# Patient Record
Sex: FEMALE | Race: WHITE | NOT HISPANIC OR LATINO
[De-identification: names, ages, dates, MRNs, and addresses within clinical notes are randomized per-mention and may not be internally consistent; named-entity substitution may affect disease eponyms.]

---

## 2020-12-30 ENCOUNTER — TRANSCRIBE ORDERS (OUTPATIENT)
Dept: SCHEDULING | Age: 35
End: 2020-12-30

## 2020-12-30 DIAGNOSIS — Z01.818 ENCOUNTER FOR OTHER PREPROCEDURAL EXAMINATION: Primary | ICD-10-CM

## 2021-01-04 ENCOUNTER — APPOINTMENT (OUTPATIENT)
Dept: LAB | Facility: HOSPITAL | Age: 36
End: 2021-01-04
Attending: OBSTETRICS & GYNECOLOGY
Payer: COMMERCIAL

## 2021-01-04 ENCOUNTER — TRANSCRIBE ORDERS (OUTPATIENT)
Dept: PREADMISSION TESTING | Facility: HOSPITAL | Age: 36
End: 2021-01-04

## 2021-01-04 DIAGNOSIS — Z11.59 ENCOUNTER FOR SCREENING FOR OTHER VIRAL DISEASES: ICD-10-CM

## 2021-01-04 DIAGNOSIS — Z01.818 ENCOUNTER FOR OTHER PREPROCEDURAL EXAMINATION: ICD-10-CM

## 2021-01-04 DIAGNOSIS — Z01.818 ENCOUNTER FOR OTHER PREPROCEDURAL EXAMINATION: Primary | ICD-10-CM

## 2021-01-04 LAB
ABO + RH BLD: NORMAL
BLD GP AB SCN SERPL QL: NEGATIVE
BLOOD BANK CMNT PATIENT-IMP: NORMAL
D AG BLD QL: POSITIVE
ERYTHROCYTE [DISTWIDTH] IN BLOOD BY AUTOMATED COUNT: 11.6 % (ref 11.7–14.4)
HCG UR QL: NEGATIVE
HCT VFR BLDCO AUTO: 41.4 % (ref 35–45)
HGB BLD-MCNC: 14.3 G/DL (ref 11.8–15.7)
LABORATORY COMMENT REPORT: NORMAL
MCH RBC QN AUTO: 30.6 PG (ref 28–33.2)
MCHC RBC AUTO-ENTMCNC: 34.5 G/DL (ref 32.2–35.5)
MCV RBC AUTO: 88.5 FL (ref 83–98)
PDW BLD AUTO: 9 FL (ref 9.4–12.3)
PLATELET # BLD AUTO: 428 K/UL (ref 150–369)
RBC # BLD AUTO: 4.68 M/UL (ref 3.93–5.22)
SPECIMEN EXP DATE BLD: NORMAL
WBC # BLD AUTO: 7.69 K/UL (ref 3.8–10.5)

## 2021-01-04 PROCEDURE — C9803 HOPD COVID-19 SPEC COLLECT: HCPCS

## 2021-01-04 PROCEDURE — 84703 CHORIONIC GONADOTROPIN ASSAY: CPT

## 2021-01-04 PROCEDURE — U0003 INFECTIOUS AGENT DETECTION BY NUCLEIC ACID (DNA OR RNA); SEVERE ACUTE RESPIRATORY SYNDROME CORONAVIRUS 2 (SARS-COV-2) (CORONAVIRUS DISEASE [COVID-19]), AMPLIFIED PROBE TECHNIQUE, MAKING USE OF HIGH THROUGHPUT TECHNOLOGIES AS DESCRIBED BY CMS-2020-01-R: HCPCS

## 2021-01-04 PROCEDURE — 86850 RBC ANTIBODY SCREEN: CPT

## 2021-01-04 PROCEDURE — 36415 COLL VENOUS BLD VENIPUNCTURE: CPT

## 2021-01-04 PROCEDURE — 85027 COMPLETE CBC AUTOMATED: CPT

## 2021-01-05 LAB — SARS-COV-2 RNA RESP QL NAA+PROBE: NOT DETECTED

## 2021-01-06 ENCOUNTER — ANESTHESIA EVENT (OUTPATIENT)
Dept: OPERATING ROOM | Facility: HOSPITAL | Age: 36
Setting detail: HOSPITAL OUTPATIENT SURGERY
End: 2021-01-06
Payer: COMMERCIAL

## 2021-01-06 PROBLEM — N80.9 ENDOMETRIOSIS DETERMINED BY LAPAROSCOPY: Status: ACTIVE | Noted: 2021-01-06

## 2021-01-06 ASSESSMENT — ENCOUNTER SYMPTOMS: HEADACHES: 1

## 2021-01-06 NOTE — H&P
Gynecology History and Physical    HPI     Patient is a 35 y.o. female who presents with LEFT LOWER QUADRANT PAIN, SEVERE DYSMENORRHEA, 2 YRS OF INFERTILITY AND A FIXED LEFT OVARY WITH CDS NODULARITY.     OB History:   OB History   No obstetric history on file.       Medical History:   Past Medical History:   Diagnosis Date   • ADD (attention deficit disorder)    • Anxiety     history of/ no meds currently   • Endometriosis    • GERD (gastroesophageal reflux disease)     no recent sx   • History of gastric ulcer 2017   • Migraines    • Muscle spasm of back     lower back-occasional   • Ovarian cyst     left ovary   • PONV (postoperative nausea and vomiting)        Surgical History:   Past Surgical History:   Procedure Laterality Date   • UPPER GASTROINTESTINAL ENDOSCOPY     • WISDOM TOOTH EXTRACTION         Social History:   Social History     Social History Narrative   • Not on file       Family History: No family history on file.    Allergies: Patient has no known allergies.    Prior to Admission medications    Medication Sig Start Date End Date Taking? Authorizing Provider   HYDROcodone-acetaminophen (NORCO) 7.5-325 mg per tablet Take 1 tablet by mouth every 6 (six) hours as needed for moderate pain.    Provider, MD Lisa   SUMAtriptan (IMITREX) 50 mg tablet Take 50 mg by mouth once as needed for migraine. May repeat dose once in 2hr if no relief. Do not exceed 2 doses in 24hr.    Provider, MD Lisa       Review of Systems  All other systems reviewed and negative except as noted in the HPI.    Objective     Vital Signs for the last 24 hours:       Exam  General Appearance: Alert, cooperative, no acute distress  Head: Normocephalic, without obvious abnormality, atraumatic  Throat: Lips, mucosa, and tongue appear normal  Neck: no adenopathy  Thyroid: no enlargement/tenderness/nodules  Lungs: Clear to auscultation bilaterally, respirations unlabored  Heart: Regular rate and rhythm, S1 and S2 normal, no  murmur, rub or gallop  Breast: Deferred  Abdomen: Soft, nontender, nondistended, bowel sounds active all four quadrants,  no masses, no organomegaly  Back: no CVA tenderness  Genitalia: no lesions or masses, no uterine, cervical, or adnexal tenderness, no discharge or vaginal bleeding present  Rectal: Deferred  Extremities: no edema or calf tenderness  Musculoskeletal: No injury or deformity  Skin: Skin color, texture, turgor normal, no rashes or lesions  Lymph nodes: inguinal and axillary nodes normal  Neurologic: Deferred    Labs  WNL          Assessment/Plan   1. SMALL LEFT OVARIAN CYST    Code Status: No Order    FOR LPY, CHROMOPERTUBATION, FULGURATION OF EMS, TANYA AND POSSIBLE LSO

## 2021-01-06 NOTE — ANESTHESIA PREPROCEDURE EVALUATION
Relevant Problems   No relevant active problems       Anesthesia ROS/MED HX    Anesthesia History    Previous anesthetics   History of anesthetic complications  - PONV  Neuro/Psych    Headaches   Anxiety  Cardiovascular   Covid19 Test Reviewed  GI/Hepatic   GERD  ROS/MED HX Comments:    GI/Hepatic/Renal: Chronic abdominal/back pain, takes Narco 7.5/325 Q 4-6h   Endo: Endometriosis  Left ovarian cyst       Past Surgical History:   Procedure Laterality Date   • UPPER GASTROINTESTINAL ENDOSCOPY     • WISDOM TOOTH EXTRACTION       LABS:    CBC Results       01/04/21                          1543           WBC 7.69           RBC 4.68           HGB 14.3           HCT 41.4           MCV 88.5           MCH 30.6           MCHC 34.5                                    BMP Results     No lab values to display.        PT/PTT Results     No lab values to display.            Results from last 7 days   Lab Units 01/04/21  1540   QUEST SARS COV 2 RNA  NOT DETECTED     Lab Results   Component Value Date    LABANTI Negative 01/04/2021    ABO A 01/04/2021    LABRH Positive 01/04/2021    HISTCK No type on file 01/04/2021         Physical Exam    Airway   Mallampati: III   TM distance: >3 FB   Neck ROM: full  Cardiovascular - normal   Rhythm: regular   Rate: normalPulmonary - normal   clear to auscultation  Dental - normal        Anesthesia Plan    Plan: general    Technique: general endotracheal     Lines and Monitors: PIV     Airway: direct visual laryngoscopy and oral intubation   ASA 2  Anesthetic plan and risks discussed with: patient  Induction:    intravenous   Postop Plan:   Patient Disposition: phase II then home   Pain Management: IV analgesics  Comments:    Plan: Background prop infusion, midaz, Decadron 8, Zofran  No toradol 2/2 h/o gastric ulcer  Takes Narco 7.5/325 Q 4-6h, pt will have higher opioid requirements

## 2021-01-07 ENCOUNTER — ANESTHESIA (OUTPATIENT)
Dept: OPERATING ROOM | Facility: HOSPITAL | Age: 36
Setting detail: HOSPITAL OUTPATIENT SURGERY
End: 2021-01-07
Payer: COMMERCIAL

## 2021-01-07 ENCOUNTER — HOSPITAL ENCOUNTER (OUTPATIENT)
Facility: HOSPITAL | Age: 36
Setting detail: HOSPITAL OUTPATIENT SURGERY
Discharge: HOME | End: 2021-01-07
Attending: OBSTETRICS & GYNECOLOGY | Admitting: OBSTETRICS & GYNECOLOGY
Payer: COMMERCIAL

## 2021-01-07 VITALS
SYSTOLIC BLOOD PRESSURE: 121 MMHG | BODY MASS INDEX: 29.16 KG/M2 | RESPIRATION RATE: 16 BRPM | TEMPERATURE: 98.4 F | WEIGHT: 175 LBS | HEART RATE: 84 BPM | DIASTOLIC BLOOD PRESSURE: 65 MMHG | OXYGEN SATURATION: 99 % | HEIGHT: 65 IN

## 2021-01-07 DIAGNOSIS — R10.2 PELVIC PAIN IN FEMALE: ICD-10-CM

## 2021-01-07 LAB
ABO + RH BLD: NORMAL
D AG BLD QL: POSITIVE
LABORATORY COMMENT REPORT: NORMAL

## 2021-01-07 PROCEDURE — 36000004 HC OR LEVEL 4 INITIAL 30MIN: Performed by: OBSTETRICS & GYNECOLOGY

## 2021-01-07 PROCEDURE — 36000014 HC OR LEVEL 4 EA ADDL MIN: Performed by: OBSTETRICS & GYNECOLOGY

## 2021-01-07 PROCEDURE — 0UB14ZZ EXCISION OF LEFT OVARY, PERCUTANEOUS ENDOSCOPIC APPROACH: ICD-10-PCS | Performed by: OBSTETRICS & GYNECOLOGY

## 2021-01-07 PROCEDURE — 25000000 HC PHARMACY GENERAL: Performed by: OBSTETRICS & GYNECOLOGY

## 2021-01-07 PROCEDURE — 71000001 HC PACU PHASE 1 INITIAL 30MIN: Performed by: OBSTETRICS & GYNECOLOGY

## 2021-01-07 PROCEDURE — 71000002 HC PACU PHASE 2 INITIAL 30MIN: Performed by: OBSTETRICS & GYNECOLOGY

## 2021-01-07 PROCEDURE — 71000012 HC PACU PHASE 2 EA ADDL MIN: Performed by: OBSTETRICS & GYNECOLOGY

## 2021-01-07 PROCEDURE — 63600000 HC DRUGS/DETAIL CODE: Performed by: NURSE ANESTHETIST, CERTIFIED REGISTERED

## 2021-01-07 PROCEDURE — 63600000 HC DRUGS/DETAIL CODE: Performed by: STUDENT IN AN ORGANIZED HEALTH CARE EDUCATION/TRAINING PROGRAM

## 2021-01-07 PROCEDURE — 25000000 HC PHARMACY GENERAL: Performed by: NURSE ANESTHETIST, CERTIFIED REGISTERED

## 2021-01-07 PROCEDURE — 37000001 HC ANESTHESIA GENERAL: Performed by: OBSTETRICS & GYNECOLOGY

## 2021-01-07 PROCEDURE — 36415 COLL VENOUS BLD VENIPUNCTURE: CPT | Performed by: OBSTETRICS & GYNECOLOGY

## 2021-01-07 PROCEDURE — 63600000 HC DRUGS/DETAIL CODE: Performed by: OBSTETRICS & GYNECOLOGY

## 2021-01-07 PROCEDURE — 63700000 HC SELF-ADMINISTRABLE DRUG: Performed by: OBSTETRICS & GYNECOLOGY

## 2021-01-07 PROCEDURE — 25800000 HC PHARMACY IV SOLUTIONS: Performed by: NURSE ANESTHETIST, CERTIFIED REGISTERED

## 2021-01-07 PROCEDURE — 27200000 HC STERILE SUPPLY: Performed by: OBSTETRICS & GYNECOLOGY

## 2021-01-07 PROCEDURE — 71000011 HC PACU PHASE 1 EA ADDL MIN: Performed by: OBSTETRICS & GYNECOLOGY

## 2021-01-07 PROCEDURE — 63700000 HC SELF-ADMINISTRABLE DRUG

## 2021-01-07 PROCEDURE — 88305 TISSUE EXAM BY PATHOLOGIST: CPT | Performed by: OBSTETRICS & GYNECOLOGY

## 2021-01-07 PROCEDURE — 27800000 HC SUPPLY/IMPLANTS: Performed by: OBSTETRICS & GYNECOLOGY

## 2021-01-07 RX ORDER — HYDROMORPHONE HYDROCHLORIDE 1 MG/ML
0.5 INJECTION, SOLUTION INTRAMUSCULAR; INTRAVENOUS; SUBCUTANEOUS
Status: DISCONTINUED | OUTPATIENT
Start: 2021-01-07 | End: 2021-01-07 | Stop reason: HOSPADM

## 2021-01-07 RX ORDER — HYDROCODONE BITARTRATE AND ACETAMINOPHEN 5; 325 MG/1; MG/1
1-2 TABLET ORAL EVERY 4 HOURS PRN
Qty: 30 TABLET | Refills: 0 | Status: SHIPPED | OUTPATIENT
Start: 2021-01-07 | End: 2021-01-12

## 2021-01-07 RX ORDER — METOCLOPRAMIDE HYDROCHLORIDE 5 MG/ML
10 INJECTION INTRAMUSCULAR; INTRAVENOUS
Status: DISCONTINUED | OUTPATIENT
Start: 2021-01-07 | End: 2021-01-07 | Stop reason: HOSPADM

## 2021-01-07 RX ORDER — PROPOFOL 10 MG/ML
INJECTION, EMULSION INTRAVENOUS AS NEEDED
Status: DISCONTINUED | OUTPATIENT
Start: 2021-01-07 | End: 2021-01-07 | Stop reason: SURG

## 2021-01-07 RX ORDER — SCOPOLAMINE 1 MG/3D
1 PATCH, EXTENDED RELEASE TRANSDERMAL ONCE
Status: DISCONTINUED | OUTPATIENT
Start: 2021-01-07 | End: 2021-01-07 | Stop reason: HOSPADM

## 2021-01-07 RX ORDER — DEXTROSE 40 %
15-30 GEL (GRAM) ORAL AS NEEDED
Status: DISCONTINUED | OUTPATIENT
Start: 2021-01-07 | End: 2021-01-07 | Stop reason: HOSPADM

## 2021-01-07 RX ORDER — ONDANSETRON HYDROCHLORIDE 2 MG/ML
4 INJECTION, SOLUTION INTRAVENOUS
Status: DISCONTINUED | OUTPATIENT
Start: 2021-01-07 | End: 2021-01-07 | Stop reason: HOSPADM

## 2021-01-07 RX ORDER — DEXTROSE 50 % IN WATER (D50W) INTRAVENOUS SYRINGE
25 AS NEEDED
Status: DISCONTINUED | OUTPATIENT
Start: 2021-01-07 | End: 2021-01-07 | Stop reason: HOSPADM

## 2021-01-07 RX ORDER — OXYCODONE HYDROCHLORIDE 5 MG/1
TABLET ORAL
Status: DISCONTINUED
Start: 2021-01-07 | End: 2021-01-07 | Stop reason: HOSPADM

## 2021-01-07 RX ORDER — KETOROLAC TROMETHAMINE 15 MG/ML
15 INJECTION, SOLUTION INTRAMUSCULAR; INTRAVENOUS EVERY 6 HOURS PRN
Status: CANCELLED | OUTPATIENT
Start: 2021-01-07 | End: 2021-01-12

## 2021-01-07 RX ORDER — FENTANYL CITRATE 50 UG/ML
50 INJECTION, SOLUTION INTRAMUSCULAR; INTRAVENOUS
Status: DISCONTINUED | OUTPATIENT
Start: 2021-01-07 | End: 2021-01-07 | Stop reason: HOSPADM

## 2021-01-07 RX ORDER — CEFAZOLIN SODIUM 2 G/50ML
SOLUTION INTRAVENOUS
Status: COMPLETED
Start: 2021-01-07 | End: 2021-01-07

## 2021-01-07 RX ORDER — CEFAZOLIN SODIUM 2 G/50ML
2 SOLUTION INTRAVENOUS
Status: COMPLETED | OUTPATIENT
Start: 2021-01-07 | End: 2021-01-07

## 2021-01-07 RX ORDER — GLYCOPYRROLATE 0.6MG/3ML
SYRINGE (ML) INTRAVENOUS AS NEEDED
Status: DISCONTINUED | OUTPATIENT
Start: 2021-01-07 | End: 2021-01-07 | Stop reason: SURG

## 2021-01-07 RX ORDER — ROCURONIUM BROMIDE 10 MG/ML
INJECTION, SOLUTION INTRAVENOUS AS NEEDED
Status: DISCONTINUED | OUTPATIENT
Start: 2021-01-07 | End: 2021-01-07 | Stop reason: SURG

## 2021-01-07 RX ORDER — FENTANYL CITRATE 50 UG/ML
INJECTION, SOLUTION INTRAMUSCULAR; INTRAVENOUS AS NEEDED
Status: DISCONTINUED | OUTPATIENT
Start: 2021-01-07 | End: 2021-01-07 | Stop reason: SURG

## 2021-01-07 RX ORDER — KETOROLAC TROMETHAMINE 30 MG/ML
INJECTION, SOLUTION INTRAMUSCULAR; INTRAVENOUS AS NEEDED
Status: DISCONTINUED | OUTPATIENT
Start: 2021-01-07 | End: 2021-01-07 | Stop reason: SURG

## 2021-01-07 RX ORDER — SODIUM CHLORIDE, SODIUM LACTATE, POTASSIUM CHLORIDE, CALCIUM CHLORIDE 600; 310; 30; 20 MG/100ML; MG/100ML; MG/100ML; MG/100ML
60 INJECTION, SOLUTION INTRAVENOUS CONTINUOUS
Status: DISCONTINUED | OUTPATIENT
Start: 2021-01-07 | End: 2021-01-07 | Stop reason: HOSPADM

## 2021-01-07 RX ORDER — ONDANSETRON HYDROCHLORIDE 2 MG/ML
INJECTION, SOLUTION INTRAVENOUS AS NEEDED
Status: DISCONTINUED | OUTPATIENT
Start: 2021-01-07 | End: 2021-01-07 | Stop reason: SURG

## 2021-01-07 RX ORDER — IBUPROFEN 200 MG
16-32 TABLET ORAL AS NEEDED
Status: DISCONTINUED | OUTPATIENT
Start: 2021-01-07 | End: 2021-01-07 | Stop reason: HOSPADM

## 2021-01-07 RX ORDER — IBUPROFEN 600 MG/1
600 TABLET ORAL 4 TIMES DAILY PRN
Qty: 90 TABLET | Refills: 0 | Status: SHIPPED | OUTPATIENT
Start: 2021-01-07 | End: 2021-02-06

## 2021-01-07 RX ORDER — LIDOCAINE HYDROCHLORIDE 10 MG/ML
INJECTION, SOLUTION INFILTRATION; PERINEURAL AS NEEDED
Status: DISCONTINUED | OUTPATIENT
Start: 2021-01-07 | End: 2021-01-07 | Stop reason: SURG

## 2021-01-07 RX ORDER — NEOSTIGMINE METHYLSULFATE 1 MG/ML
INJECTION INTRAVENOUS AS NEEDED
Status: DISCONTINUED | OUTPATIENT
Start: 2021-01-07 | End: 2021-01-07 | Stop reason: SURG

## 2021-01-07 RX ORDER — DEXAMETHASONE SODIUM PHOSPHATE 4 MG/ML
INJECTION, SOLUTION INTRA-ARTICULAR; INTRALESIONAL; INTRAMUSCULAR; INTRAVENOUS; SOFT TISSUE AS NEEDED
Status: DISCONTINUED | OUTPATIENT
Start: 2021-01-07 | End: 2021-01-07 | Stop reason: SURG

## 2021-01-07 RX ORDER — HYDROMORPHONE HYDROCHLORIDE 1 MG/ML
INJECTION, SOLUTION INTRAMUSCULAR; INTRAVENOUS; SUBCUTANEOUS AS NEEDED
Status: DISCONTINUED | OUTPATIENT
Start: 2021-01-07 | End: 2021-01-07 | Stop reason: SURG

## 2021-01-07 RX ORDER — SCOPOLAMINE 1 MG/3D
PATCH, EXTENDED RELEASE TRANSDERMAL
Status: DISCONTINUED
Start: 2021-01-07 | End: 2021-01-07 | Stop reason: HOSPADM

## 2021-01-07 RX ORDER — MIDAZOLAM HYDROCHLORIDE 2 MG/2ML
INJECTION, SOLUTION INTRAMUSCULAR; INTRAVENOUS AS NEEDED
Status: DISCONTINUED | OUTPATIENT
Start: 2021-01-07 | End: 2021-01-07 | Stop reason: SURG

## 2021-01-07 RX ORDER — KETOROLAC TROMETHAMINE 30 MG/ML
30 INJECTION, SOLUTION INTRAMUSCULAR; INTRAVENOUS
Status: DISCONTINUED | OUTPATIENT
Start: 2021-01-07 | End: 2021-01-07 | Stop reason: HOSPADM

## 2021-01-07 RX ORDER — SODIUM CHLORIDE 9 MG/ML
INJECTION, SOLUTION INTRAVENOUS CONTINUOUS PRN
Status: DISCONTINUED | OUTPATIENT
Start: 2021-01-07 | End: 2021-01-07 | Stop reason: SURG

## 2021-01-07 RX ORDER — PROPOFOL 10 MG/ML
INJECTION, EMULSION INTRAVENOUS CONTINUOUS PRN
Status: DISCONTINUED | OUTPATIENT
Start: 2021-01-07 | End: 2021-01-07 | Stop reason: SURG

## 2021-01-07 RX ORDER — BUPIVACAINE HYDROCHLORIDE AND EPINEPHRINE 5; 5 MG/ML; UG/ML
INJECTION, SOLUTION EPIDURAL; INTRACAUDAL; PERINEURAL AS NEEDED
Status: DISCONTINUED | OUTPATIENT
Start: 2021-01-07 | End: 2021-01-07 | Stop reason: HOSPADM

## 2021-01-07 RX ORDER — OXYCODONE HYDROCHLORIDE 5 MG/1
5 TABLET ORAL EVERY 4 HOURS PRN
Status: DISCONTINUED | OUTPATIENT
Start: 2021-01-07 | End: 2021-01-07 | Stop reason: HOSPADM

## 2021-01-07 RX ADMIN — CEFAZOLIN 2 G: 330 INJECTION, POWDER, FOR SOLUTION INTRAMUSCULAR; INTRAVENOUS at 09:25

## 2021-01-07 RX ADMIN — ONDANSETRON 4 MG: 2 INJECTION INTRAMUSCULAR; INTRAVENOUS at 10:17

## 2021-01-07 RX ADMIN — HYDROMORPHONE HYDROCHLORIDE 0.5 MG: 1 INJECTION, SOLUTION INTRAMUSCULAR; INTRAVENOUS; SUBCUTANEOUS at 11:42

## 2021-01-07 RX ADMIN — FENTANYL CITRATE 100 MCG: 50 INJECTION, SOLUTION INTRAMUSCULAR; INTRAVENOUS at 09:16

## 2021-01-07 RX ADMIN — LIDOCAINE HYDROCHLORIDE 5 ML: 10 INJECTION, SOLUTION INFILTRATION; PERINEURAL at 09:16

## 2021-01-07 RX ADMIN — ROCURONIUM BROMIDE 50 MG: 10 INJECTION, SOLUTION INTRAVENOUS at 09:16

## 2021-01-07 RX ADMIN — FENTANYL CITRATE 50 MCG: 50 INJECTION, SOLUTION INTRAMUSCULAR; INTRAVENOUS at 10:56

## 2021-01-07 RX ADMIN — SCOPOLAMINE 1 PATCH: 1 PATCH, EXTENDED RELEASE TRANSDERMAL at 08:16

## 2021-01-07 RX ADMIN — FENTANYL CITRATE 100 MCG: 50 INJECTION, SOLUTION INTRAMUSCULAR; INTRAVENOUS at 09:12

## 2021-01-07 RX ADMIN — SCOPOLAMINE 1 PATCH: 1 PATCH TRANSDERMAL at 08:16

## 2021-01-07 RX ADMIN — HYDROMORPHONE HYDROCHLORIDE 0.5 MG: 1 INJECTION, SOLUTION INTRAMUSCULAR; INTRAVENOUS; SUBCUTANEOUS at 10:11

## 2021-01-07 RX ADMIN — DEXAMETHASONE SODIUM PHOSPHATE 4 MG: 4 INJECTION, SOLUTION INTRA-ARTICULAR; INTRALESIONAL; INTRAMUSCULAR; INTRAVENOUS; SOFT TISSUE at 09:25

## 2021-01-07 RX ADMIN — HYDROMORPHONE HYDROCHLORIDE 0.5 MG: 1 INJECTION, SOLUTION INTRAMUSCULAR; INTRAVENOUS; SUBCUTANEOUS at 10:35

## 2021-01-07 RX ADMIN — KETOROLAC TROMETHAMINE 30 MG: 30 INJECTION, SOLUTION INTRAMUSCULAR at 10:13

## 2021-01-07 RX ADMIN — PROPOFOL 100 MG: 10 INJECTION, EMULSION INTRAVENOUS at 10:11

## 2021-01-07 RX ADMIN — SODIUM CHLORIDE: 900 INJECTION, SOLUTION INTRAVENOUS at 08:54

## 2021-01-07 RX ADMIN — PROPOFOL 200 MG: 10 INJECTION, EMULSION INTRAVENOUS at 09:16

## 2021-01-07 RX ADMIN — PROPOFOL 25 MCG/KG/MIN: 10 INJECTION, EMULSION INTRAVENOUS at 09:25

## 2021-01-07 RX ADMIN — Medication 0.6 MG: at 10:35

## 2021-01-07 RX ADMIN — OXYCODONE HYDROCHLORIDE 5 MG: 5 TABLET ORAL at 13:14

## 2021-01-07 RX ADMIN — NEOSTIGMINE METHYLSULFATE 3 MG: 1 INJECTION INTRAVENOUS at 10:35

## 2021-01-07 RX ADMIN — FENTANYL CITRATE 50 MCG: 50 INJECTION, SOLUTION INTRAMUSCULAR; INTRAVENOUS at 11:12

## 2021-01-07 RX ADMIN — MIDAZOLAM HYDROCHLORIDE 2 MG: 1 INJECTION, SOLUTION INTRAMUSCULAR; INTRAVENOUS at 09:09

## 2021-01-07 NOTE — DISCHARGE SUMMARY
Inpatient Discharge Summary    BRIEF OVERVIEW  Admitting Provider: Jaydon Mart MD  Discharge Provider: Jaydon Mart MD  Primary Care Physician at Discharge: Regina Walters [phone]    Admission Date: 1/7/2021     Discharge Date: 1/7/2021    Primary Discharge Diagnosis  Pelvic pain and infertility    Secondary Discharge Diagnosis  Significant endometriosis, left ovarian endometrioma, pelvic and abdominal adhesions    Discharge Disposition  Home    Discharge Medications     Medication List      START taking these medications    ibuprofen 600 mg tablet  Commonly known as: MOTRIN  Take 1 tablet (600 mg total) by mouth 4 (four) times a day as needed for mild pain (pain).  Dose: 600 mg        CHANGE how you take these medications    * HYDROcodone-acetaminophen 7.5-325 mg per tablet  Commonly known as: NORCO  Take 1 tablet by mouth every 6 (six) hours as needed for moderate pain.  Dose: 1 tablet  What changed: Another medication with the same name was added. Make sure you understand how and when to take each.     * HYDROcodone-acetaminophen 5-325 mg per tablet  Commonly known as: NORCO  Take 1-2 tablets by mouth every 4 (four) hours as needed for moderate pain (pain) for up to 5 days.  Dose: 1-2 tablet  What changed: You were already taking a medication with the same name, and this prescription was added. Make sure you understand how and when to take each.         * This list has 2 medication(s) that are the same as other medications prescribed for you. Read the directions carefully, and ask your doctor or other care provider to review them with you.            CONTINUE taking these medications    SUMAtriptan 50 mg tablet  Commonly known as: IMITREX  Take 50 mg by mouth once as needed for migraine. May repeat dose once in 2hr if no relief. Do not exceed 2 doses in 24hr.  Dose: 50 mg            Active Issues Requiring Follow-up  Follow-up Appointments Arranged: Yes     Outpatient Follow-Up  No future  appointments.    Test Results Pending at Discharge  Pending Labs     Order Current Status    Pathology Tissue Exam In process          DETAILS OF HOSPITAL STAY    Presenting Problem/History of Present Illness  Pelvic pain in female [R10.2]  Endometriosis determined by laparoscopy [N80.9]  Endometriosis determined by laparoscopy [N80.9]      Hospital Course/Operative Procedures Performed  Pt underwent laparoscopic lysis of adhesions, fulguration of endometriosis, left ovarian cystectomy and chromopertubation          Jaydon Mart MD

## 2021-01-07 NOTE — PERIOPERATIVE NURSING NOTE
Pt OOB without dizziness and is tolerating po well. Abd bandaids clean; Abd soft, ice on. Scant amount of bloody drainage. Discharge instructions given to and reviewed with pt with good understanding verbalized.

## 2021-01-07 NOTE — PERIOPERATIVE NURSING NOTE
Pt c/o pain 9/10, additional pain meds continued. Pt denies any nausea or vomiting. Pt started on ice chips - Pt on RA, VSS

## 2021-01-07 NOTE — DISCHARGE INSTRUCTIONS
Return to Dr Mart's office in 4 weeks.  Call for temperature > 100.2, heavy bleeding or unusual pain.  Take Norco and ibuprofen for pain.  Use a heating pad as needed.  No physical restrictions except as dictated by pain.  Remove steri strips before one week

## 2021-01-07 NOTE — ANESTHESIOLOGIST PRE-PROCEDURE ATTESTATION
Pre-Procedure Patient Identification:  I am the Primary Anesthesiologist and have identified the patient on 01/07/21 at 8:13 AM.   I have confirmed the following procedure(s) Diagnostic Laparoscopy, Possible Ovarian Cystectomies, Possible Fulguration Of Endometriosis, Lysis Of Adhesions, Left Salpingo-Oophorectomy will be performed by the following surgeon/proceduralist Jaydon Mart MD.

## 2021-01-07 NOTE — ANESTHESIA PROCEDURE NOTES
Airway  Urgency: elective    Start Time: 1/7/2021 9:20 AM  Airway not difficult    General Information and Staff    Patient location during procedure: OR  Anesthesiologist: Mattie Ryder MD  Resident/CRNA: Denisha Ferrell CRNA  Performed: resident/CRNA     Indications and Patient Condition  Indications for airway management: anesthesia  Sedation level: deep  Preoxygenated: yes  Patient position: sniffing  MILS maintained throughout  Mask difficulty assessment: 1 - vent by mask    Final Airway Details  Final airway type: endotracheal airway      Successful airway: ETT  Cuffed: yes   Successful intubation technique: direct laryngoscopy  Facilitating devices/methods: intubating stylet  Endotracheal tube insertion site: oral  Blade: Cass  Blade size: #3  ETT size (mm): 7.0  Cormack-Lehane Classification: grade I - full view of glottis  Placement verified by: chest auscultation and capnometry   Measured from: teeth  ETT to teeth (cm): 21  Number of attempts at approach: 1  Ventilation between attempts: none  Number of other approaches attempted: 0  Atraumatic airway insertion

## 2021-01-07 NOTE — ANESTHESIA POSTPROCEDURE EVALUATION
Patient: Melody Herring    Procedure Summary     Date: 01/07/21 Room / Location: [facility] OR  / [facility] OR [facility]    Anesthesia Start: 0909 Anesthesia Stop: 1052    Procedure: Diagnostic Laparoscopy,  Ovarian cyst wall removal   Fulguration Of Endometriosis, Lysis Of Adhesions, (N/A Abdomen) Diagnosis:       Pelvic pain in female      (Pelvic pain)    Surgeon: Jaydon Mart MD Responsible Provider: Mattie Ryder MD    Anesthesia Type: general ASA Status: 2          Anesthesia Type: general  PACU Vitals  1/7/2021 1048 - 1/7/2021 1106      1/7/2021  1051 1/7/2021  1100          BP:  133/75  136/78      Temp:  36.5 °C (97.7 °F)  --      Pulse:  80  68      Resp:  20  13      SpO2:  100 %  100 %              Anesthesia Post Evaluation    Pain management: satisfactory to patient  Mode of pain management: IV medication  Patient location during evaluation: PACU  Patient participation: complete - patient participated  Level of consciousness: awake and alert  Cardiovascular status: acceptable and hemodynamically stable  Airway Patency: adequate and patent  Respiratory status: acceptable, nonlabored ventilation, spontaneous ventilation and nasal cannula  Hydration status: stable  Anesthetic complications: no

## 2021-01-07 NOTE — OP NOTE
Diagnostic Laparoscopy,  Ovarian cyst wall removal   Fulguration Of Endometriosis, Lysis Of Adhesions, Procedure Note     Procedure:    Diagnostic Laparoscopy,  Ovarian cyst wall removal   Fulguration Of Endometriosis, Lysis Of Adhesions,  CPT(R) Code:  76309 - MO LAP,RMV  ADNEXAL STRUCTURE      Indications: The patient was admitted to the hospital with a long history of left lower correction pain and 2 years of infertility.  Ultrasound revealed a left ovarian cyst and her clinical exam revealed a fixed tender large left ovary with obvious nodularity in the cul-de-sac.       @[email or web address]@chronic left lower quadrant pain and infertility consistent with endometriosis    @postopdx@left ovarian endometrioma and pelvic endometriosis and pelvic adhesions      Surgeon: Jaydon Mart MD     Assistants: Kirby Melissa DO    Anesthesia: General endotracheal anesthesia    ASA Class: 1      Procedure Details   Please note the assistance of Dr. Roy was needed due to the significant complexity of this operation.  Please also note that there were no gynecology residents at this institution capable of performing the service    Patient was placed on the operating room table and prepped and draped in the usual sterile fashion in the dorsolithotomy position.  Catheter was placed in her daughter, and a Jeong cannula that had been prefilled with indigo carmine dye was placed through the cervix and attached with a tenaculum.  A small umbilical incision was then made with a scalpel through which a varies needle was placed allowing the creation of a pneumoperitoneum of 2.7 L of carbon dioxide.  Under real-time guidance two 5 mm ports were placed at the right and left iliac crest regions after injection with Marcaine.  Visualization of the upper abdomen revealed a normal liver edge normal gallbladder.  There was significant bowel and omental adhesions along the right anterior abdominal wall and these were taken down primarily with  the LigaSure.  Visualization of the pelvis revealed a normal right ovary and the right fallopian tube that appeared normal.  The cul-de-sac was significant for multiple white and black endometrial implants.  There was extensive adhesions of the sigmoid colon and other bowel to the left anterior pelvic sidewall and anterior abdominal wall.  The left ovary was approximately 8 cm in size and was firmly fixed in the cul-de-sac and to the left pelvic sidewall.  The next procedure was to appropriately lyse the adhesions on the left pelvic sidewall and anterior abdominal wall using scissors and countertraction and taking care to avoid bowel.  All structures were kept in continuous visual guidance.  After the bowel was dropped well out of the field the left ovary and right ovary were able to be more fully assessed.  At this point the indigo carmine dye was instilled through the uterus and the left tube filled and spilled immediately.  The right fallopian tube filled but did not spill.  Was uncertain after multiple attempts whether this represented a true tubal occlusion or not.  The fimbriated end of the right fallopian tube was lush and unimpeded with adhesions.  Using electrified spatula tip an incision was made over the antimesenteric portion of the left ovary allowing us to visualize the clear dome of a large left ovarian cyst.  This was gradually dissected and ultimately it was opened with copious chocolate material emanating from it.  The cyst was able to be grasped and dissected away from the underlying cortex and was brought out in 1 piece.  At this point the left ovary was grasped and continuous traction and countertraction with occasional sharp dissection allowed us to remove it from its dense adhesions in the cul-de-sac.  Some of the cul-de-sac endometrial implants were then fulgurated, however somewhere close to sigmoid and rectum and were left in place.  Extensive irrigation aspiration was carried out.  The  left ovarian cortex was sealed shut with the LigaSure and hemostasis was excellent.  The area under the left ovary was treated with FloSeal and the entire left adnexa was left in Interceed gauze.  At this point all instruments were removed and the patient was awoken and taken to the recovery room in excellent condition    Findings:  Extensive endometriosis, pelvic and abdominal adhesions, and a large left ovarian endometrioma.  Left tubal patency was confirmed and right tubal patency is suspected    Estimated Blood Loss:  50 mL           Drains: Crocker           Specimens:   ID Type Source Tests Collected by Time Destination   1 : Left Ovary cyst wall Tissue Ovary, Left PATHOLOGY TISSUE EXAM Jaydon Mart MD 1/7/2021 1008               Implants: None           Complications: None           Disposition: PACU - hemodynamically stable.           Condition: stable    Jaydon Mart MD  Phone Number: [phone]

## 2021-01-07 NOTE — PROGRESS NOTES
H&P on chart is still accurate and valid.  Case disc with patient who has good understanding of goals and risks involved.

## 2021-01-09 LAB
CASE RPRT: NORMAL
CLINICAL INFO: NORMAL
PATH REPORT.FINAL DX SPEC: NORMAL
PATH REPORT.GROSS SPEC: NORMAL

## 2023-01-26 ENCOUNTER — TELEPHONE (OUTPATIENT)
Dept: GASTROENTEROLOGY | Facility: CLINIC | Age: 38
End: 2023-01-26

## 2023-01-30 ENCOUNTER — OFFICE VISIT (OUTPATIENT)
Dept: GASTROENTEROLOGY | Facility: CLINIC | Age: 38
End: 2023-01-30

## 2023-01-30 ENCOUNTER — TELEPHONE (OUTPATIENT)
Dept: GASTROENTEROLOGY | Facility: CLINIC | Age: 38
End: 2023-01-30

## 2023-01-30 VITALS
HEIGHT: 65 IN | WEIGHT: 153.6 LBS | BODY MASS INDEX: 25.59 KG/M2 | SYSTOLIC BLOOD PRESSURE: 137 MMHG | DIASTOLIC BLOOD PRESSURE: 70 MMHG

## 2023-01-30 DIAGNOSIS — R14.0 BLOATING: ICD-10-CM

## 2023-01-30 DIAGNOSIS — K21.9 GASTROESOPHAGEAL REFLUX DISEASE, UNSPECIFIED WHETHER ESOPHAGITIS PRESENT: ICD-10-CM

## 2023-01-30 DIAGNOSIS — K59.04 CHRONIC IDIOPATHIC CONSTIPATION: ICD-10-CM

## 2023-01-30 DIAGNOSIS — Z12.11 COLON CANCER SCREENING: ICD-10-CM

## 2023-01-30 DIAGNOSIS — R11.0 NAUSEA: Primary | ICD-10-CM

## 2023-01-30 PROBLEM — Z83.719 FAMILY HISTORY OF COLONIC POLYPS: Status: ACTIVE | Noted: 2023-01-30

## 2023-01-30 PROBLEM — Z83.71 FAMILY HISTORY OF COLONIC POLYPS: Status: ACTIVE | Noted: 2023-01-30

## 2023-01-30 RX ORDER — OMEPRAZOLE 40 MG/1
40 CAPSULE, DELAYED RELEASE ORAL EVERY MORNING
COMMUNITY
Start: 2023-01-23

## 2023-01-30 RX ORDER — BUSPIRONE HYDROCHLORIDE 30 MG/1
30 TABLET ORAL 2 TIMES DAILY
COMMUNITY
Start: 2023-01-09

## 2023-01-30 RX ORDER — VENLAFAXINE HYDROCHLORIDE 37.5 MG/1
CAPSULE, EXTENDED RELEASE ORAL
COMMUNITY
Start: 2022-11-14

## 2023-01-30 RX ORDER — BUPROPION HYDROCHLORIDE 100 MG/1
100 TABLET, EXTENDED RELEASE ORAL 2 TIMES DAILY
COMMUNITY
Start: 2023-01-09

## 2023-01-30 RX ORDER — GABAPENTIN 300 MG/1
CAPSULE ORAL
COMMUNITY
Start: 2023-01-09

## 2023-01-30 RX ORDER — ARIPIPRAZOLE 2 MG/1
TABLET ORAL
COMMUNITY
Start: 2023-01-16

## 2023-01-30 RX ORDER — VENLAFAXINE HYDROCHLORIDE 75 MG/1
CAPSULE, EXTENDED RELEASE ORAL DAILY
COMMUNITY
Start: 2023-01-04

## 2023-01-30 NOTE — TELEPHONE ENCOUNTER
Scheduled date of combo   (as of today):3-8-23  Physician performing colonoscopy:Shin  Location of colonoscopy:[facility]  Bowel prep reviewed with patient:Jose   Instructions reviewed with patient by:given directions PQ  Clearances: no

## 2023-01-30 NOTE — PROGRESS NOTES
[id number] - Outpatient Consultation  Lottie Rosales 40 y o  female MRN: [id number]  Encounter: [id number]    ASSESSMENT AND PLAN:      1  Nausea  This is a 37F referred to GI by Dr Estefanía Torres MD for 4 months of worsening nausea, bloating and constipation since her surgery in 9/2022 for endometriosis  Sounds like she is still recovering from the surgery - although there does appear to be a chronic component - ? IBS vs functional dyspepsia vs psych med induced  Will need to rule out PUD and biliary disease  Already on a gluten limited diet but will check celiac panel and bx at EGD     - Comprehensive metabolic panel; Future  - CBC and differential; Future  - Celiac Disease Panel; Future  - US abdomen complete; Future  - EGD; Future  - Continue omeprazole 40mg po daily    2  Bloating  Cont dairy and gluten limited diets for now  3  Chronic idiopathic constipation  Moves her bowels regularly but often feels incomplete evacuation  Currently getting pelvic PT since her ALLYSON w improvement  However, given her changes in bowels w rectal fullness and intermittent BRBPR, will assess w a colonoscopy  - Colonoscopy; Future  - polyethylene glycol (COLYTE) 4000 mL solution; Take 4,000 mL by mouth once for 1 dose Take 4000 mL by mouth once for 1 dose  Use as directed  Dispense: 4000 mL; Refill: 0    4  Gastroesophageal reflux disease, unspecified whether esophagitis present  On omeprazole 40mg daily since 9/2022    5   Colon cancer screening  Average risk, never had a colonoscopy      Followup Appointment: after EGD/COLON/US/Labs  ______________________________________________________________________    Chief Complaint   Patient presents with   • Nausea   • Constipation   • Bloated       HPI:   Lottie Rosales is a 40y o  year old female who presents today at the request of her PCP for nausea, bloating, changes in bowel habits since September 2022 when she had her hysterectomy  Patient states that she always had some intermittent issues with nausea and bloating  Never constipation  However, she was having a lot of issues with her endometriosis  Initially had a resection surgery which then turned into a full abdominal hysterectomy in September 2022  Postop, she had some complications including C  difficile  However, since then she has been feeling a lot of progressive nausea and abdominal bloating sensations with some constipation  She moves her bowels every day, but always feels fullness in her rectum  She is getting pelvic PT  No family history of colon cancer  Occasional rectal bleeding  Never actually vomits  Denies any odynophagia or dysphagia      Historical Information   Past Medical History:   Diagnosis Date   • Anxiety    • Depression    • Endometriosis    • GERD (gastroesophageal reflux disease)    • Migraines      Past Surgical History:   Procedure Laterality Date   • HYSTERECTOMY     • LAPAROSCOPY     • UPPER GASTROINTESTINAL ENDOSCOPY       Social History     Substance and Sexual Activity   Alcohol Use Yes   • Alcohol/week: 2 0 standard drinks   • Types: 2 Standard drinks or equivalent per week     Social History     Substance and Sexual Activity   Drug Use Never     Social History     Tobacco Use   Smoking Status Former   • Types: Cigarettes   Smokeless Tobacco Never     Family History   Problem Relation Age of Onset   • Hypertension Mother    • Prostate cancer Mother    • Breast cancer additional onset Father    • Hyperlipidemia Father    • Hypertension Father    • Colon cancer Paternal Grandmother    • Colon polyps Neg Hx        Meds/Allergies     Current Outpatient Medications:   •  ARIPiprazole (ABILIFY) 2 mg tablet  •  buPROPion (WELLBUTRIN SR) 100 mg 12 hr tablet  •  busPIRone (BUSPAR) 30 MG tablet  •  gabapentin (NEURONTIN) 300 mg capsule  •  omeprazole (PriLOSEC) 40 MG capsule  •  polyethylene glycol (COLYTE) 4000 mL solution  •  Rimegepant Sulfate (NURTEC PO)  •  venlafaxine (EFFEXOR-XR) 37 5 mg 24 hr capsule  •  venlafaxine (EFFEXOR-XR) 75 mg 24 hr capsule    No Known Allergies    PHYSICAL EXAM:    Blood pressure 137/70, height 5' 5" (1 651 m), weight 69 7 kg (153 lb 9 6 oz)  Body mass index is 25 56 kg/m²  General Appearance: NAD, cooperative, alert  Eyes: Anicteric, PERRLA, EOMI  ENT:  Normocephalic, atraumatic, normal mucosa  Neck:  Supple, symmetrical, trachea midline,   Resp:  Clear to auscultation bilaterally; no rales, rhonchi or wheezing; respirations unlabored   CV:  S1 S2, Regular rate and rhythm; no murmur, rub, or gallop  GI:  Soft, non-tender, non-distended; normal bowel sounds; no masses, no organomegaly   Rectal: Deferred  Musculoskeletal: No cyanosis, clubbing or edema  Normal ROM  Skin:  No jaundice, rashes, or lesions   Heme/Lymph: No palpable cervical lymphadenopathy  Psych: Normal affect, good eye contact  Neuro: No gross deficits, AAOx3    Lab Results:   No results found for: WBC, HGB, HCT, MCV, PLT  No results found for: NA, K, CL, CO2, ANIONGAP, BUN, CREATININE, GLUCOSE, GLUF, CALCIUM, CORRECTEDCA, AST, ALT, ALKPHOS, PROT, BILITOT, EGFR  No results found for: IRON, TIBC, FERRITIN  No results found for: LIPASE    Radiology Results:   No results found  REVIEW OF SYSTEMS:    CONSTITUTIONAL: Denies any fever, chills, rigors, and weight loss  HEENT: No earache or tinnitus  Denies hearing loss or visual disturbances  CARDIOVASCULAR: No chest pain or palpitations  RESPIRATORY: Denies any cough, hemoptysis, shortness of breath or dyspnea on exertion  GASTROINTESTINAL: As noted in the History of Present Illness  GENITOURINARY: No problems with urination  Denies any hematuria or dysuria  NEUROLOGIC: No dizziness or vertigo, denies headaches  MUSCULOSKELETAL: Denies any muscle or joint pain  SKIN: Denies skin rashes or itching  ENDOCRINE: Denies excessive thirst  Denies intolerance to heat or cold    PSYCHOSOCIAL: Denies depression or anxiety  Denies any recent memory loss

## 2023-02-22 ENCOUNTER — TELEPHONE (OUTPATIENT)
Dept: GASTROENTEROLOGY | Facility: CLINIC | Age: 38
End: 2023-02-22

## 2023-03-08 ENCOUNTER — ANESTHESIA (OUTPATIENT)
Dept: GASTROENTEROLOGY | Facility: AMBULATORY SURGERY CENTER | Age: 38
End: 2023-03-08

## 2023-03-08 ENCOUNTER — HOSPITAL ENCOUNTER (OUTPATIENT)
Dept: GASTROENTEROLOGY | Facility: AMBULATORY SURGERY CENTER | Age: 38
Discharge: HOME | End: 2023-03-08
Attending: INTERNAL MEDICINE

## 2023-03-08 ENCOUNTER — ANESTHESIA EVENT (OUTPATIENT)
Dept: GASTROENTEROLOGY | Facility: AMBULATORY SURGERY CENTER | Age: 38
End: 2023-03-08

## 2023-03-08 VITALS
DIASTOLIC BLOOD PRESSURE: 55 MMHG | HEART RATE: 66 BPM | SYSTOLIC BLOOD PRESSURE: 112 MMHG | BODY MASS INDEX: 24.99 KG/M2 | WEIGHT: 150 LBS | OXYGEN SATURATION: 100 % | RESPIRATION RATE: 16 BRPM | HEIGHT: 65 IN

## 2023-03-08 DIAGNOSIS — K59.04 CHRONIC IDIOPATHIC CONSTIPATION: ICD-10-CM

## 2023-03-08 DIAGNOSIS — R11.0 NAUSEA: ICD-10-CM

## 2023-03-08 RX ORDER — SODIUM CHLORIDE, SODIUM LACTATE, POTASSIUM CHLORIDE, CALCIUM CHLORIDE 600; 310; 30; 20 MG/100ML; MG/100ML; MG/100ML; MG/100ML
50 INJECTION, SOLUTION INTRAVENOUS CONTINUOUS
Status: DISCONTINUED | OUTPATIENT
Start: 2023-03-08 | End: 2023-03-12 | Stop reason: HOSPADM

## 2023-03-08 RX ORDER — TOPIRAMATE 25 MG/1
25 TABLET ORAL EVERY MORNING
COMMUNITY
Start: 2023-02-14

## 2023-03-08 RX ORDER — PROPOFOL 10 MG/ML
INJECTION, EMULSION INTRAVENOUS AS NEEDED
Status: DISCONTINUED | OUTPATIENT
Start: 2023-03-08 | End: 2023-03-08

## 2023-03-08 RX ORDER — GALCANEZUMAB 120 MG/ML
INJECTION, SOLUTION SUBCUTANEOUS
COMMUNITY
Start: 2023-01-25

## 2023-03-08 RX ADMIN — PROPOFOL 50 MG: 10 INJECTION, EMULSION INTRAVENOUS at 10:02

## 2023-03-08 RX ADMIN — PROPOFOL 100 MG: 10 INJECTION, EMULSION INTRAVENOUS at 09:40

## 2023-03-08 RX ADMIN — SODIUM CHLORIDE, SODIUM LACTATE, POTASSIUM CHLORIDE, CALCIUM CHLORIDE: 600; 310; 30; 20 INJECTION, SOLUTION INTRAVENOUS at 10:06

## 2023-03-08 RX ADMIN — PROPOFOL 100 MG: 10 INJECTION, EMULSION INTRAVENOUS at 09:47

## 2023-03-08 RX ADMIN — PROPOFOL 100 MG: 10 INJECTION, EMULSION INTRAVENOUS at 09:52

## 2023-03-08 RX ADMIN — PROPOFOL 50 MG: 10 INJECTION, EMULSION INTRAVENOUS at 10:00

## 2023-03-08 RX ADMIN — PROPOFOL 100 MG: 10 INJECTION, EMULSION INTRAVENOUS at 09:57

## 2023-03-08 RX ADMIN — SODIUM CHLORIDE, SODIUM LACTATE, POTASSIUM CHLORIDE, CALCIUM CHLORIDE 50 ML/HR: 600; 310; 30; 20 INJECTION, SOLUTION INTRAVENOUS at 08:53

## 2023-03-08 RX ADMIN — PROPOFOL 100 MG: 10 INJECTION, EMULSION INTRAVENOUS at 09:43

## 2023-03-08 NOTE — ANESTHESIA POSTPROCEDURE EVALUATION
Post-Op Assessment Note    CV Status:  Stable  Pain Score: 0    Pain management: adequate     Mental Status:  Alert and awake   Hydration Status:  Euvolemic   PONV Controlled:  Controlled   Airway Patency:  Patent      Post Op Vitals Reviewed: Yes      Staff: CRNA         No notable events documented      BP   121/69   Temp   98   Pulse  80   Resp   16   SpO2   100

## 2023-03-08 NOTE — ANESTHESIA PREPROCEDURE EVALUATION
Procedure:  COLONOSCOPY  EGD    Relevant Problems   GI/HEPATIC   (+) Gastroesophageal reflux disease        Physical Exam    Airway    Mallampati score: I  TM Distance: >3 FB  Neck ROM: full     Dental       Cardiovascular  Cardiovascular exam normal    Pulmonary  Pulmonary exam normal     Other Findings        Anesthesia Plan  ASA Score- 2     Anesthesia Type- IV sedation with anesthesia with ASA Monitors  Additional Monitors:   Airway Plan:           Plan Factors-Exercise tolerance (METS): >4 METS  Chart reviewed  EKG reviewed  Imaging results reviewed  Existing labs reviewed  Patient summary reviewed  Induction- intravenous  Postoperative Plan- Plan for postoperative opioid use  Planned trial extubation    Informed Consent- Anesthetic plan and risks discussed with patient  I personally reviewed this patient with the CRNA  Discussed and agreed on the Anesthesia Plan with the CRNA  Becca Palomino

## 2023-03-08 NOTE — H&P
History and Physical - [id number]    Kim Fish 40 y o  female MRN: [id number]      HPI: Kim Fish is a 40y o  year old female who presents for nausea, constipation, rectal fullness  No Known Allergies      REVIEW OF SYSTEMS: Per the HPI, and otherwise unremarkable      Historical Information     Past Medical History:   Diagnosis Date   • Anxiety    • Depression    • Endometriosis    • GERD (gastroesophageal reflux disease)    • Migraines      Past Surgical History:   Procedure Laterality Date   • HYSTERECTOMY     • LAPAROSCOPY     • UPPER GASTROINTESTINAL ENDOSCOPY       Social History   Social History     Substance and Sexual Activity   Alcohol Use Yes   • Alcohol/week: 2 0 standard drinks   • Types: 2 Standard drinks or equivalent per week     Social History     Substance and Sexual Activity   Drug Use Yes   • Types: Marijuana    Comment: medical     Social History     Tobacco Use   Smoking Status Former   • Types: Cigarettes   Smokeless Tobacco Never     Family History   Problem Relation Age of Onset   • Hypertension Mother    • Prostate cancer Mother    • Colon polyps Mother    • Breast cancer additional onset Father    • Hyperlipidemia Father    • Hypertension Father    • Colon cancer Paternal Grandmother        Meds/Allergies       Current Outpatient Medications:   •  buPROPion (WELLBUTRIN SR) 100 mg 12 hr tablet  •  busPIRone (BUSPAR) 30 MG tablet  •  Emgality 120 MG/ML SOAJ  •  gabapentin (NEURONTIN) 300 mg capsule  •  omeprazole (PriLOSEC) 40 MG capsule  •  Rimegepant Sulfate (NURTEC PO)  •  topiramate (TOPAMAX) 25 mg tablet  •  venlafaxine (EFFEXOR-XR) 75 mg 24 hr capsule  •  polyethylene glycol (COLYTE) 4000 mL solution    Current Facility-Administered Medications:   •  lactated ringers infusion, 50 mL/hr, Intravenous, Continuous, 50 mL/hr at 03/08/23 0853        Objective     /63   Pulse 78   Resp 15   Ht 5' 5" (1 651 m)   Wt 68 kg (150 lb) SpO2 100%   BMI 24 96 kg/m²       PHYSICAL EXAM    Gen: NAD AAOx3  Head: Normocephalic, Atraumatic  CV: S1S2 RRR no m/r/g  CHEST: Clear b/l no c/r/w  ABD: soft, +BS NT/ND no masses  EXT: no edema      ASSESSMENT/PLAN:  This is a 40y o  year old female here for EGD and colonoscopy, and she is stable and optimized for her procedure

## 2023-03-16 NOTE — RESULT ENCOUNTER NOTE
No egd recall  Colon recall 10 years  D/w patient  Has US scheduled for next week  Nursing: pls call pt to make sure she has an office visit in the next 4-6 weeks  Thank you

## 2023-03-20 ENCOUNTER — TELEPHONE (OUTPATIENT)
Dept: GASTROENTEROLOGY | Facility: CLINIC | Age: 38
End: 2023-03-20

## 2023-03-20 NOTE — TELEPHONE ENCOUNTER
LVM for the pt to call back and schedule her 4 week follow up appt with Dr Colt Newby on 04/20 at 4:45pm

## 2023-03-20 NOTE — TELEPHONE ENCOUNTER
----- Message from Alysia Gimenez MD sent at 3/16/2023 11:38 AM EDT -----  No egd recall  Colon recall 10 years  D/w patient  Has US scheduled for next week  Nursing: pls call pt to make sure she has an office visit in the next 4-6 weeks  Thank you

## 2023-03-28 NOTE — TELEPHONE ENCOUNTER
Lvm for patient to call back and schedule for f/u from her procedure  Told her to ask for the office directly, since we have the ability to shorten possible appts on 5/1, 5/5, 5/12, and 5/24 with Dr Clarence Estevez

## 2023-03-31 PROBLEM — Z12.11 COLON CANCER SCREENING: Status: RESOLVED | Noted: 2023-01-30 | Resolved: 2023-03-31

## 2023-09-08 ENCOUNTER — TELEPHONE (OUTPATIENT)
Dept: GASTROENTEROLOGY | Facility: CLINIC | Age: 38
End: 2023-09-08

## 2023-09-08 NOTE — TELEPHONE ENCOUNTER
9/8/23 Discussed with Dr NEWTON [facility].  Ultrasound was ordered, sent that order to [facility] [place]

## 2023-09-08 NOTE — TELEPHONE ENCOUNTER
Patients GI provider:  Dr. Clovis Gaytan    Number to return call: [phone]    Reason for call: Pt calling to have ct order faxed to [address] [place] [address].  Fax: [phone]    Scheduled procedure/appointment date if applicable:

## 2024-11-22 LAB — HBA1C MFR BLD HPLC: 5.2 %

## (undated) DEVICE — TROCAR SLEEVE 5MM

## (undated) DEVICE — TUBING SMOKE EVAC PENCIL COATED

## (undated) DEVICE — SOLN IV 0.9% NSS 1000ML

## (undated) DEVICE — PACK GYN LAPAROSCOPY BMH

## (undated) DEVICE — NEEDLE DISP HYPO 25GX1-1/2IN

## (undated) DEVICE — GOWN SURGICAL REINFORCED X-LAR

## (undated) DEVICE — ***USE 138545*** SUTURE VICRYL 0 J616H TIES 54IN

## (undated) DEVICE — MASTISOL LIQUID ADHESIVE

## (undated) DEVICE — SYRINGE DISP LUER-LOK 10 CC

## (undated) DEVICE — GLOVE SZ 7.5 LINER PROTEXIS PI BL

## (undated) DEVICE — TROCAR BLADED 12 X 100MM Z-THREAD

## (undated) DEVICE — TROCAR BLADED 5 X 100MM Z-THREAD

## (undated) DEVICE — TRAY WET SKIN PREP PREMIUM

## (undated) DEVICE — DRAPE HALF STERILE

## (undated) DEVICE — TUBING STRYKEFLOW SUCT/IRRIG 10IN

## (undated) DEVICE — DRAPE UNDER BUTTOCKS W/FLUID

## (undated) DEVICE — STERISTRIP 1/2INX4IN

## (undated) DEVICE — APPLICATOR CHLORAPREP 26ML ORANGE TINT

## (undated) DEVICE — SEALANT SURGICAL FLOSEAL 10ML STERILE PREP

## (undated) DEVICE — INTERCEED  3 X 4

## (undated) DEVICE — COVERS LIGHT HANDLE DISPOSABLE

## (undated) DEVICE — SUTURE VICRYL ENDOLOOP   0   EJ10G

## (undated) DEVICE — SEALER/DIVIDER LIGASURE BLUNT TIP LAPAROSCOPIC 5 MM

## (undated) DEVICE — NEEDLE INSUFFLATION 120MM

## (undated) DEVICE — PAD POSITIONING XL W/ARM PROTECTORS

## (undated) DEVICE — PAD POSITIONING XL

## (undated) DEVICE — BANDAID FLEXIBLE FABRIC 1IN 50/BX LATEX FREE

## (undated) DEVICE — EVACUATOR PLUME-AWAY LAP SMOKE PKG

## (undated) DEVICE — TUBING INSUFFLATION PNEUMOSURE HEATED HIGH FLOW

## (undated) DEVICE — ***USE 139216*** SUTURE CTD VICRYL 0 UND J870H CP-2 27IN

## (undated) DEVICE — SYSTEM VISUALIZATION CLEARIFY

## (undated) DEVICE — TRAY URINE METER FOLEY NON-SILVER

## (undated) DEVICE — APPLICATOR FLOSEAL ENDOSCOPIC

## (undated) DEVICE — SUTURE VICRYL 4-0 J496H 18IN

## (undated) DEVICE — GLOVE SZ 7.5 PROTEXIS CLASSIC LATEX

## (undated) DEVICE — CORD LAPAROSCOPIC DISP STERILE